# Patient Record
Sex: FEMALE | Race: WHITE | NOT HISPANIC OR LATINO | Employment: FULL TIME | URBAN - METROPOLITAN AREA
[De-identification: names, ages, dates, MRNs, and addresses within clinical notes are randomized per-mention and may not be internally consistent; named-entity substitution may affect disease eponyms.]

---

## 2017-09-26 ENCOUNTER — AMBULATORY - HEALTHEAST (OUTPATIENT)
Dept: INTERNAL MEDICINE | Facility: CLINIC | Age: 57
End: 2017-09-26

## 2017-09-26 ENCOUNTER — COMMUNICATION - HEALTHEAST (OUTPATIENT)
Dept: INTERNAL MEDICINE | Facility: CLINIC | Age: 57
End: 2017-09-26

## 2017-09-27 ENCOUNTER — RECORDS - HEALTHEAST (OUTPATIENT)
Dept: ADMINISTRATIVE | Facility: OTHER | Age: 57
End: 2017-09-27

## 2017-09-29 ENCOUNTER — COMMUNICATION - HEALTHEAST (OUTPATIENT)
Dept: INTERNAL MEDICINE | Facility: CLINIC | Age: 57
End: 2017-09-29

## 2017-10-17 ENCOUNTER — HOSPITAL ENCOUNTER (OUTPATIENT)
Dept: MAMMOGRAPHY | Facility: CLINIC | Age: 57
Discharge: HOME | End: 2017-10-17
Attending: INTERNAL MEDICINE

## 2017-10-17 DIAGNOSIS — Z12.31 VISIT FOR SCREENING MAMMOGRAM: ICD-10-CM

## 2017-10-23 ENCOUNTER — COMMUNICATION - HEALTHEAST (OUTPATIENT)
Dept: INTERNAL MEDICINE | Facility: CLINIC | Age: 57
End: 2017-10-23

## 2017-10-24 ENCOUNTER — OFFICE VISIT - HEALTHEAST (OUTPATIENT)
Dept: INTERNAL MEDICINE | Facility: CLINIC | Age: 57
End: 2017-10-24

## 2017-10-24 DIAGNOSIS — Z78.0 POSTMENOPAUSAL: ICD-10-CM

## 2017-10-24 DIAGNOSIS — H26.9 CATARACT: ICD-10-CM

## 2017-10-24 DIAGNOSIS — Z00.00 ROUTINE GENERAL MEDICAL EXAMINATION AT A HEALTH CARE FACILITY: ICD-10-CM

## 2017-10-24 ASSESSMENT — MIFFLIN-ST. JEOR: SCORE: 1446.99

## 2017-10-25 LAB
CHOLEST SERPL-MCNC: 248 MG/DL
FASTING STATUS PATIENT QL REPORTED: YES
HCV AB SERPL QL IA: NEGATIVE
HDLC SERPL-MCNC: 52 MG/DL
LDLC SERPL CALC-MCNC: 177 MG/DL
TRIGL SERPL-MCNC: 95 MG/DL

## 2017-12-13 ENCOUNTER — RECORDS - HEALTHEAST (OUTPATIENT)
Dept: ADMINISTRATIVE | Facility: OTHER | Age: 57
End: 2017-12-13

## 2017-12-13 ENCOUNTER — RECORDS - HEALTHEAST (OUTPATIENT)
Dept: BONE DENSITY | Facility: CLINIC | Age: 57
End: 2017-12-13

## 2017-12-13 DIAGNOSIS — Z78.0 ASYMPTOMATIC MENOPAUSAL STATE: ICD-10-CM

## 2020-07-01 ENCOUNTER — HOSPITAL ENCOUNTER (OUTPATIENT)
Dept: MAMMOGRAPHY | Facility: CLINIC | Age: 60
Discharge: HOME | End: 2020-07-01
Attending: INTERNAL MEDICINE

## 2020-07-01 DIAGNOSIS — Z12.31 VISIT FOR SCREENING MAMMOGRAM: ICD-10-CM

## 2020-08-24 ENCOUNTER — OFFICE VISIT - HEALTHEAST (OUTPATIENT)
Dept: INTERNAL MEDICINE | Facility: CLINIC | Age: 60
End: 2020-08-24

## 2020-08-24 ENCOUNTER — COMMUNICATION - HEALTHEAST (OUTPATIENT)
Dept: INTERNAL MEDICINE | Facility: CLINIC | Age: 60
End: 2020-08-24

## 2020-08-24 ENCOUNTER — AMBULATORY - HEALTHEAST (OUTPATIENT)
Dept: INTERNAL MEDICINE | Facility: CLINIC | Age: 60
End: 2020-08-24

## 2020-08-24 DIAGNOSIS — Z13.220 ENCOUNTER FOR SCREENING FOR LIPOID DISORDERS: ICD-10-CM

## 2020-08-24 DIAGNOSIS — Z00.00 ROUTINE GENERAL MEDICAL EXAMINATION AT A HEALTH CARE FACILITY: ICD-10-CM

## 2020-08-24 DIAGNOSIS — J30.2 SEASONAL ALLERGIC RHINITIS, UNSPECIFIED TRIGGER: ICD-10-CM

## 2020-08-24 DIAGNOSIS — E78.00 HYPERCHOLESTEROLEMIA: ICD-10-CM

## 2020-08-24 LAB
ALBUMIN SERPL-MCNC: 3.9 G/DL (ref 3.5–5)
ALBUMIN UR-MCNC: NEGATIVE MG/DL
ALP SERPL-CCNC: 66 U/L (ref 45–120)
ALT SERPL W P-5'-P-CCNC: 20 U/L (ref 0–45)
ANION GAP SERPL CALCULATED.3IONS-SCNC: 11 MMOL/L (ref 5–18)
APPEARANCE UR: CLEAR
AST SERPL W P-5'-P-CCNC: 19 U/L (ref 0–40)
BILIRUB SERPL-MCNC: 0.5 MG/DL (ref 0–1)
BILIRUB UR QL STRIP: NEGATIVE
BUN SERPL-MCNC: 10 MG/DL (ref 8–22)
CALCIUM SERPL-MCNC: 9.6 MG/DL (ref 8.5–10.5)
CHLORIDE BLD-SCNC: 103 MMOL/L (ref 98–107)
CHOLEST SERPL-MCNC: 248 MG/DL
CO2 SERPL-SCNC: 26 MMOL/L (ref 22–31)
COLOR UR AUTO: YELLOW
CREAT SERPL-MCNC: 0.78 MG/DL (ref 0.6–1.1)
ERYTHROCYTE [DISTWIDTH] IN BLOOD BY AUTOMATED COUNT: 12.3 % (ref 11–14.5)
FASTING STATUS PATIENT QL REPORTED: YES
GFR SERPL CREATININE-BSD FRML MDRD: >60 ML/MIN/1.73M2
GLUCOSE BLD-MCNC: 93 MG/DL (ref 70–125)
GLUCOSE UR STRIP-MCNC: NEGATIVE MG/DL
HCT VFR BLD AUTO: 41.7 % (ref 35–47)
HDLC SERPL-MCNC: 53 MG/DL
HGB BLD-MCNC: 14.1 G/DL (ref 12–16)
HGB UR QL STRIP: NEGATIVE
KETONES UR STRIP-MCNC: NEGATIVE MG/DL
LDLC SERPL CALC-MCNC: 176 MG/DL
LEUKOCYTE ESTERASE UR QL STRIP: NEGATIVE
MCH RBC QN AUTO: 29.9 PG (ref 27–34)
MCHC RBC AUTO-ENTMCNC: 33.7 G/DL (ref 32–36)
MCV RBC AUTO: 89 FL (ref 80–100)
NITRATE UR QL: NEGATIVE
PH UR STRIP: 6.5 [PH] (ref 5–8)
PLATELET # BLD AUTO: 361 THOU/UL (ref 140–440)
PMV BLD AUTO: 7.6 FL (ref 7–10)
POTASSIUM BLD-SCNC: 4.7 MMOL/L (ref 3.5–5)
PROT SERPL-MCNC: 6.7 G/DL (ref 6–8)
RBC # BLD AUTO: 4.7 MILL/UL (ref 3.8–5.4)
SODIUM SERPL-SCNC: 140 MMOL/L (ref 136–145)
SP GR UR STRIP: 1.01 (ref 1–1.03)
TRIGL SERPL-MCNC: 95 MG/DL
UROBILINOGEN UR STRIP-ACNC: NORMAL
WBC: 6.1 THOU/UL (ref 4–11)

## 2020-08-24 RX ORDER — FEXOFENADINE HCL 180 MG/1
180 TABLET ORAL DAILY PRN
Qty: 30 TABLET | Refills: 2 | Status: SHIPPED | COMMUNITY
Start: 2020-08-24

## 2020-08-24 ASSESSMENT — MIFFLIN-ST. JEOR: SCORE: 1437.92

## 2020-08-25 LAB
25(OH)D3 SERPL-MCNC: 30.7 NG/ML (ref 30–80)
25(OH)D3 SERPL-MCNC: 30.7 NG/ML (ref 30–80)

## 2020-11-05 ENCOUNTER — AMBULATORY - HEALTHEAST (OUTPATIENT)
Dept: INTERNAL MEDICINE | Facility: CLINIC | Age: 60
End: 2020-11-05

## 2020-11-05 ENCOUNTER — AMBULATORY - HEALTHEAST (OUTPATIENT)
Dept: NURSING | Facility: CLINIC | Age: 60
End: 2020-11-05

## 2020-11-05 ENCOUNTER — COMMUNICATION - HEALTHEAST (OUTPATIENT)
Dept: FAMILY MEDICINE | Facility: CLINIC | Age: 60
End: 2020-11-05

## 2020-11-05 DIAGNOSIS — Z23 ENCOUNTER FOR IMMUNIZATION: ICD-10-CM

## 2021-03-30 ENCOUNTER — AMBULATORY - HEALTHEAST (OUTPATIENT)
Dept: NURSING | Facility: CLINIC | Age: 61
End: 2021-03-30

## 2021-04-20 ENCOUNTER — AMBULATORY - HEALTHEAST (OUTPATIENT)
Dept: NURSING | Facility: CLINIC | Age: 61
End: 2021-04-20

## 2021-05-25 ENCOUNTER — RECORDS - HEALTHEAST (OUTPATIENT)
Dept: ADMINISTRATIVE | Facility: CLINIC | Age: 61
End: 2021-05-25

## 2021-05-27 ENCOUNTER — RECORDS - HEALTHEAST (OUTPATIENT)
Dept: ADMINISTRATIVE | Facility: CLINIC | Age: 61
End: 2021-05-27

## 2021-05-28 ENCOUNTER — RECORDS - HEALTHEAST (OUTPATIENT)
Dept: ADMINISTRATIVE | Facility: CLINIC | Age: 61
End: 2021-05-28

## 2021-05-29 ENCOUNTER — RECORDS - HEALTHEAST (OUTPATIENT)
Dept: ADMINISTRATIVE | Facility: CLINIC | Age: 61
End: 2021-05-29

## 2021-05-30 ENCOUNTER — RECORDS - HEALTHEAST (OUTPATIENT)
Dept: ADMINISTRATIVE | Facility: CLINIC | Age: 61
End: 2021-05-30

## 2021-05-31 VITALS — WEIGHT: 196 LBS | BODY MASS INDEX: 32.65 KG/M2 | HEIGHT: 65 IN

## 2021-06-04 VITALS
SYSTOLIC BLOOD PRESSURE: 130 MMHG | HEART RATE: 73 BPM | BODY MASS INDEX: 32.32 KG/M2 | HEIGHT: 65 IN | WEIGHT: 194 LBS | DIASTOLIC BLOOD PRESSURE: 84 MMHG | OXYGEN SATURATION: 99 %

## 2021-06-12 NOTE — TELEPHONE ENCOUNTER
I entered new order as I cannot find where I was suppose to cosign this order anywhere on the chart

## 2021-06-13 NOTE — PROGRESS NOTES
Office Visit - Physical   Chela Meeks   57 y.o.  female    Date of visit: 10/24/2017  Physician: Manolo Metzger MD     Assessment and Plan   1. Routine general medical examination at a health care facility  Immunizations are reviewed and she declines having flu shot.  Will confirm when she has had her tetanus.  Living will discussed.  Non-smoker.  Uses alcohol in moderation.  Regular exercise discussed.  Up to date with colonoscopies and this should be repeated every 10 years.  She sees an OB/GYN and get a mammogram annually.  She is postmenopausal and will make arrangements for baseline DEXA.  She sees an ophthalmologist regularly and gets glaucoma screening.  Skin exam performed and recommending regular use of sunblock.  Hepatitis C antibody for screening.  Will screen for diabetes with fasting glucose.  Checking fasting lipid profile.  - Lipid [place]  - Comprehensive Metabolic Panel  - Hemoglobin  - Urinalysis  - Hepatitis C Antibody (Anti-HCV)    2. Cataract  Preop completed.  See separate form    3. Postmenopausal  Arrange for DEXA  - DXA Bone Density Scan; Future    Return in about 1 year (around 10/24/2018) for Annual physical.     Chief Complaint   Chief Complaint   Patient presents with     Annual Exam     Pre-op Exam     10-31 11-14 [facility] Dr oSrensen        Patient Profile   Social History     Social History Narrative        [employer]    3 children    4 grandchildren        Past Medical History   Patient Active Problem List   Diagnosis     Cataract     Impingement syndrome of right shoulder       Past Surgical History  She has a past surgical history that includes Tubal ligation.     History of Present Illness   This 57 y.o. old woman is here for her annual physical and to reestablish care.  Not seen for over 3 years.  Also having cataract surgery and needs preop.  Some concerns about difficulty losing weight but otherwise feeling well.  Lifestyle is rather  "sedentary mostly because of her job.  She is a non-smoker.    Review of Systems: A comprehensive review of systems was negative except as noted.  General: No chronic fatigue, unexpected weight loss or weight gain, fevers, chills, or night sweats  Eyes: Cataracts  ENT: No ear or sinus infections.  No change in hearing.  No tinnitus.  Respiratory: No wheezing, dyspnea on exertion, or chronic cough  Cardiovascular: No chest pain, palpitations, dizziness, or syncope.  No peripheral edema.  GI: No abdominal pain, reflux symptoms, dysphagia, nausea, vomiting, constipation, or diarrhea  : No change in frequency or incontinence.  No hematuria.  Skin: No new rashes or lesions.  Neurologic: No headaches, seizures, dizziness, weakness, or numbness.  Musculoskeletal: No muscle or joint pain.  Lymphatic: No swollen lymph nodes  Psychiatric: No depression, anxiety, or sleep disorder.       Medications and Allergies   No current outpatient prescriptions on file.     No current facility-administered medications for this visit.      No Known Allergies     Family and Social History   Family History   Problem Relation Age of Onset     Lung cancer Mother      Esophageal cancer Father      Prostate cancer Father      Diabetes type II Brother         Social History   Substance Use Topics     Smoking status: Never Smoker     Smokeless tobacco: Never Used     Alcohol use Yes      Comment: 2-4 / week        Physical Exam   General Appearance:   Overweight but otherwise well-appearing middle-aged woman    /72 (Patient Site: Left Arm, Patient Position: Sitting, Cuff Size: Adult Regular)  Pulse 64  Ht 5' 4.5\" (1.638 m)  Wt 196 lb (88.9 kg)  LMP 08/04/2014  SpO2 98%  BMI 33.12 kg/m2    EYES: Eyelids, conjunctiva, and sclera were normal. Pupils were normal. Cornea, iris, and lens were normal bilaterally.  HEAD, EARS, NOSE, MOUTH, AND THROAT: Head and face were normal. Nose appearance was normal and there was no discharge. " Oropharynx was normal.  NECK: Neck appearance was normal. There were no neck masses and the thyroid was not enlarged and no nodules are felt.  No lymphadenopathy.  RESPIRATORY: Breathing pattern was normal and the chest moved symmetrically.  Percussion/auscultatory percussion was normal.  Lung sounds were normal and there were no rales or wheezes.  CARDIOVASCULAR: Heart rate and rhythm were normal.  S1 and S2 were normal and there were no extra sounds or murmurs. Peripheral pulses in arms and legs were normal.  Jugular venous pressure was normal.  There was no peripheral edema.  No carotid bruits.  BREAST: Deferred, OB/GYN  GASTROINTESTINAL: The abdomen was normal in contour.  Bowel sounds were present.  Percussion detected no organ enlargement or tenderness.  Palpation detected no tenderness, mass, or enlarged organs.   PELVIC: Deferred, OB/GYN  MUSCULOSKELETAL: Skeletal configuration was normal and muscle mass was normal for age. Joint appearance was overall normal.  LYMPHATIC: There were no enlarged nodes.  SKIN/HAIR/NAILS: Skin color was normal.  There were no skin lesions.  Hair and nails were normal.  NEUROLOGIC: The patient was alert and oriented to person, place, time, and circumstance. Speech was normal. Cranial nerves were normal. Motor strength was normal for age. The patient was normally coordinated.  Sensation intact.  PSYCHIATRIC:  Mood and affect were normal and the patient had normal recent and remote memory. The patient's judgment and insight were normal.       Additional Information        Manolo Metzger MD  Internal Medicine  Contact me at [phone]

## 2021-06-13 NOTE — PROGRESS NOTES
Preoperative Consultation   Chela Meeks   57 y.o.  female    Date of visit: 10/24/2017  Physician: Manolo Metzger MD    This is a preoperative consultation requested by Dr. Sorensen in preparation for bilateral cataract extraction on October 31, 2017 and November 14, 2017 at [facility].       Assessment and Plan   Chela Meeks was seen in preoperative consultation in preparation for bilateral cataract extraction.  This is a low risk surgery and the patient has no increased risk for major cardiac complications.  No contraindications to proceeding with general anesthesia and surgery    1. Routine general medical examination at a health care facility    2. Cataract    3. Postmenopausal         Patient Profile   Social History     Social History Narrative        [employer]    3 children    4 grandchildren        Past Medical History   Patient Active Problem List   Diagnosis     Cataract     Impingement syndrome of right shoulder       Past Surgical History  She has a past surgical history that includes Tubal ligation.     History of Present Illness   This 57 y.o. old healthy woman with cataracts to have surgery in the next few weeks.  No history of coronary artery disease or congestive heart failure.  No chronic respiratory problems.  No exertional chest pain.  No problems with general anesthesia.    Recent antiplatelet use: no  Personal or family history of bleeding or clotting disorders: no  Steroid use in the past year: no  Personal or family history of difficulty with anesthesia: no  Current cardiopulmonary symptoms: no      Review of Systems: A comprehensive review of systems was negative except as noted.     Medications and Allergies   No current outpatient prescriptions on file.     No current facility-administered medications for this visit.      No Known Allergies     Family and Social History   Family History   Problem Relation Age of Onset     Lung cancer  "Mother      Esophageal cancer Father      Prostate cancer Father      Diabetes type II Brother         Social History   Substance Use Topics     Smoking status: Never Smoker     Smokeless tobacco: Never Used     Alcohol use Yes      Comment: 2-4 / week        Physical Exam   General Appearance:   Overweight but otherwise well-appearing middle-aged woman    /72 (Patient Site: Left Arm, Patient Position: Sitting, Cuff Size: Adult Regular)  Pulse 64  Ht 5' 4.5\" (1.638 m)  Wt 196 lb (88.9 kg)  LMP 08/04/2014  SpO2 98%  BMI 33.12 kg/m2    HEENT: Normal  RESPIRATORY: Breathing pattern was normal and the chest moved symmetrically.  Percussion/auscultatory percussion was normal.  Lung sounds were normal and there were no abnormal sounds.  CARDIOVASCULAR: Heart rate and rhythm were normal.  S1 and S2 were normal and there were no extra sounds or murmurs. Peripheral pulses in arms and legs were normal.  Jugular venous pressure was normal.  There was no peripheral edema.  No carotid bruits.  GASTROINTESTINAL: The abdomen was normal in contour.  Bowel sounds were present.  Percussion detected no organ enlargement or tenderness.  Palpation detected no tenderness, mass, or enlarged organs.   SKIN/HAIR/NAILS: No cyanosis or pallor  NEUROLOGIC: Grossly nonfocal  PSYCHIATRIC:  Mood and affect were normal and the patient had normal recent and remote memory. The patient's judgment and insight were normal.         Additional Tests            Manolo Metzger MD  Internal Medicine  Contact me at [phone]  "

## 2021-06-16 PROBLEM — M75.41 IMPINGEMENT SYNDROME OF RIGHT SHOULDER: Status: ACTIVE | Noted: 2017-10-24

## 2021-06-18 NOTE — PATIENT INSTRUCTIONS - HE
Patient Instructions by Manolo Metzger MD at 8/24/2020 11:20 AM     Author: Manolo Metzger MD Service: -- Author Type: Physician    Filed: 8/24/2020 11:59 AM Encounter Date: 8/24/2020 Status: Addendum    : Manolo Metzger MD (Physician)    Related Notes: Original Note by Manolo Metzger MD (Physician) filed at 8/24/2020 11:50 AM       Healthcare maintenance    Recommending annual flu shot every fall    Make sure you return in 2 to 6 months to have your second Shingrix vaccine    Recommending getting a tetanus vaccine (Tdap).  This can be obtained at your pharmacy as well    Continue mammograms every year    Follow-up with OB/GYN    Colonoscopy will be due 2024      Patient Education   Understanding USDA MyPlate  The USDA (US Department of Agriculture) has guidelines to help you make healthy food choices. These are called MyPlate. MyPlate shows the food groups that make up healthy meals using the image of a place setting. Before you eat, think about the healthiest choices for what to put onto your plate or into your cup or bowl. To learn more about building a healthy plate, visit www.choosemyplate.gov.       The Food Groups    Fruits: Any fruit or 100% fruit juice counts as part of the Fruit Group. Fruits may be fresh, canned, frozen, or dried, and may be whole, cut-up, or pureed. Make half your plate fruits and vegetables.    Vegetables: Any vegetable or 100% vegetable juice counts as a member of the Vegetable Group. Vegetables may be fresh, frozen, canned, or dried. They can be served raw or cooked and may be whole, cut-up, or mashed. Make half your plate fruits and vegetables.     Grains: All foods made from grains are part of the Grains Group. These include wheat, rice, oats, cornmeal, and barley such as bread, pasta, oatmeal, cereal, tortillas, and grits. Grains should be no more than a quarter of your plate. At least half of your grains should be whole grains.    Protein: This  group includes meat, poultry, seafood, beans and peas, eggs, processed soy products (like tofu), nuts (including nut butters), and seeds. Make protein choices no more than a quarter of your plate. Meat and poultry choices should be lean or low fat.    Dairy: All fluid milk products and foods made from milk that contain calcium, like yogurt and cheese are part of the Dairy Group. (Foods that have little calcium, such as cream, butter, and cream cheese, are not part of the group.) Most dairy choices should be low-fat or fat-free.    Oils: These are fats that are liquid at room temperature. They include canola, corn, olive, soybean, and sunflower oil. Foods that are mainly oil include mayonnaise, certain salad dressings, and soft margarines. You should have only 5 to 7 teaspoons of oils a day. You probably already get this much from the food you eat.  Use [employer] to Help Build Your Meals  The SuperTracker can help you plan and track your meals and activity. You can look up individual foods to see or compare their nutritional value. You can get guidelines for what and how much you should eat. You can compare your food choices. And you can assess personal physical activities and see ways you can improve. Go to www.[place]plate.gov/supertracker/.    1064-9106 [employer]. [address] [place] [address], [place] [address]. All rights reserved. This information is not intended as a substitute for professional medical care. Always follow your healthcare professional's instructions.

## 2021-06-27 ENCOUNTER — HEALTH MAINTENANCE LETTER (OUTPATIENT)
Age: 61
End: 2021-06-27

## 2021-06-29 NOTE — PROGRESS NOTES
Progress Notes by Manolo Metzger MD at 8/24/2020 11:20 AM     Author: Manolo Metzger MD Service: -- Author Type: Physician    Filed: 8/24/2020  5:45 PM Encounter Date: 8/24/2020 Status: Signed    : Manolo Metzger MD (Physician)       FEMALE PREVENTATIVE EXAM    Assessment and Plan:       1. Routine general medical examination at a health care facility  Immunizations are reviewed and recommending flu shot and Tdap.  She can get these at her pharmacy.  Providing first Shingrix today and she will return in 2 to 6 months for her second injection.  We discussed possible side effects of vaccine. Living will has been discussed.  Non-smoker.  Uses alcohol in moderation.  Regular exercise discussed.  Up to date with colonoscopies and this should be repeated in 2024.  Continue mammograms annually.  Continue to follow-up with OB/GYN annually.  DEXA at age 65 for osteoporosis screening.  Recommending eye exam every 1 to 2 years.  Skin exam performed and recommending regular use of sunblock.  Hepatitis C antibody for screening was normal.  Will screen for diabetes with fasting glucose.  Checking fasting lipid profile.   - Comprehensive Metabolic Panel  - HM2(CBC w/o Differential)  - Urinalysis-UC if Indicated  - Vitamin D, Total (25-Hydroxy)    2. Hypercholesterolemia  Cholesterol was elevated last year and this will be rechecked today    3. Seasonal allergic rhinitis, unspecified trigger  Uses Allegra for seasonal allergies  - fexofenadine (ALLEGRA) 180 MG tablet; Take 1 tablet (180 mg total) by mouth daily as needed.  Dispense: 30 tablet; Refill: 2    4. Encounter for screening for lipoid disorders    - Lipid [place]- FASTING        Next follow up:  Return in 1 year (on 8/24/2021) for Annual physical.    Immunization Review  Adult Imm Review: See above        I discussed the following with the patient:   Adult Healthy Living: Importance of regular exercise  Healthy nutrition  Importance of weight  "loss        Subjective:   Chief Complaint: Chela Meeks is an 60 y.o. female here for a preventative health visit.     HPI: Here for her annual physical.  Overall feeling well with no new specific complaints.  She had lost 20 pounds last year but put most of this back on.  She has been more sedentary and needs to work at diet modifications    Healthy Habits  Are you taking a daily aspirin? No  Do you typically exercising at least 40 min, 3-4 times per week?  NO  Do you usually eat at least 4 servings of fruit and vegetables a day, include whole grains and fiber and avoid regularly eating high fat foods? NO  Have you had an eye exam in the past two years? Yes  Do you see a dentist twice per year? Yes  Do you have any concerns regarding sleep? No    Safety Screen    Do you feel you are safe where you are living?: Yes (8/24/2020 11:20 AM)  Do you feel you are safe in your relationship(s)?: Yes (8/24/2020 11:20 AM)      Review of Systems:  Please see above.  The rest of the review of systems are negative for all systems.     Pap History:   Last 3 PAP and HPV Results:   Cancer Screening       Status Date      PAP SMEAR Overdue 4/6/1981     MAMMOGRAM Next Due 7/1/2022      Done 7/1/2020 MAMMO SCREENING BILATERAL     Patient has more history with this topic...              History     Reviewed By Date/Time Sections Reviewed    Manolo Metzger MD 8/24/2020 11:40 AM Medical, Surgical, Tobacco, Alcohol, Drug Use, Sexual Activity, Family, Social Documentation    Katy Marshall CMA 8/24/2020 11:20 AM Tobacco, Alcohol, Drug Use, Sexual Activity            Objective:   Vital Signs:   Visit Vitals  /84 (Patient Site: Left Arm, Patient Position: Sitting, Cuff Size: Adult Large)   Pulse 73   Ht 5' 4.5\" (1.638 m)   Wt 194 lb (88 kg)   LMP 08/04/2014   SpO2 99%   BMI 32.79 kg/m           PHYSICAL EXAM  EYES: Eyelids, conjunctiva, and sclera were normal. Pupils were normal. Cornea, iris, and lens were normal " bilaterally.  HEAD, EARS, NOSE, MOUTH, AND THROAT: Head and face were normal.  TMs normal  NECK: Neck appearance was normal. There were no neck masses and the thyroid was not enlarged and no nodules are felt.  No lymphadenopathy.  RESPIRATORY: Breathing pattern was normal and the chest moved symmetrically.  Percussion/auscultatory percussion was normal.  Lung sounds were normal and there were no rales or wheezes.  CARDIOVASCULAR: Heart rate and rhythm were normal.  S1 and S2 were normal and there were no extra sounds or murmurs. Peripheral pulses in arms and legs were normal.  Jugular venous pressure was normal.  There was no peripheral edema.  No carotid bruits.  GASTROINTESTINAL: Bowel sounds were present.   Palpation detected no tenderness, mass, or enlarged organs.   MUSCULOSKELETAL: Skeletal configuration was normal and muscle mass was normal for age. Joint appearance was overall normal.  LYMPHATIC: There were no enlarged nodes.  SKIN/HAIR/NAILS: Skin color was normal.  Hair and nails were normal.There were no skin lesions.  NEUROLOGIC: The patient was alert and oriented to person, place, time, and circumstance. Speech was normal. Cranial nerves were normal. Motor strength was normal for age. The patient was normally coordinated.  Sensation intact.  PSYCHIATRIC:  Mood and affect were normal and the patient had normal recent and remote memory. The patient's judgment and insight were normal.    The 10-year ASCVD risk score (Ciro DC Jr., et al., 2013) is: 4.2%    Values used to calculate the score:      Age: 60 years      Sex: Female      Is Non- : No      Diabetic: No      Tobacco smoker: No      Systolic Blood Pressure: 130 mmHg      Is BP treated: No      HDL Cholesterol: 52 mg/dL      Total Cholesterol: 248 mg/dL         Medication List          Accurate as of August 24, 2020  5:45 PM. If you have any questions, ask your nurse or doctor.            START taking these medications     fexofenadine 180 MG tablet  Also known as:  ALLEGRA  INSTRUCTIONS:  Take 1 tablet (180 mg total) by mouth daily as needed.  Started by:  Manolo Metzger MD              Where to Get Your Medications      You can get these medications from any pharmacy    You don't need a prescription for these medications    fexofenadine 180 MG tablet         Additional Screenings Completed Today:

## 2021-07-13 ENCOUNTER — MYC MEDICAL ADVICE (OUTPATIENT)
Dept: INTERNAL MEDICINE | Facility: CLINIC | Age: 61
End: 2021-07-13

## 2021-07-13 ENCOUNTER — RECORDS - HEALTHEAST (OUTPATIENT)
Dept: ADMINISTRATIVE | Facility: CLINIC | Age: 61
End: 2021-07-13

## 2021-07-13 NOTE — TELEPHONE ENCOUNTER
I can see her for a video visit at 3:50 PM on Tuesday, July 20    LMOM that she was added to the schedule for next Tuesday, July 20th at 3:50  Fani Peterson CMA

## 2021-07-20 ENCOUNTER — VIRTUAL VISIT (OUTPATIENT)
Dept: INTERNAL MEDICINE | Facility: CLINIC | Age: 61
End: 2021-07-20
Payer: COMMERCIAL

## 2021-07-20 DIAGNOSIS — Z29.89 ALTITUDE SICKNESS PREVENTATIVE MEASURES: Primary | ICD-10-CM

## 2021-07-20 PROCEDURE — 99213 OFFICE O/P EST LOW 20 MIN: CPT | Mod: 95 | Performed by: INTERNAL MEDICINE

## 2021-07-20 RX ORDER — ACETAZOLAMIDE 125 MG/1
125 TABLET ORAL 2 TIMES DAILY
Qty: 16 TABLET | Refills: 0 | Status: SHIPPED | OUTPATIENT
Start: 2021-07-20 | End: 2023-07-20

## 2021-07-20 NOTE — PROGRESS NOTES
"Chela is a 61 year old who is being evaluated via a billable telephone visit.      What phone number would you like to be contacted at? [phone]  How would you like to obtain your AVS? Rick    Assessment & Plan     Altitude sickness preventative measures  61-year-old woman with a history of getting altitude sickness who is planning trip to [place] next week.  She will be at approximately 8000 feet.  She previously took medication but she cannot remember what that was.  However, it did allow her to travel to high altitude of [place].  I would recommend starting Diamox 125 mg twice daily 24 hours before arriving at altitude.  She should continue the medication for a minimum of 48 hours and use as needed thereafter.  We also discussed maintaining good hydration drinking plenty of water.  I also discussed the need to get treatment at urgent care should her symptoms develop despite the above measures.  - acetaZOLAMIDE (DIAMOX) 125 MG tablet; Take 1 tablet (125 mg) by mouth 2 times daily Begin the day before arriving at altitude and continue for at least 2 days after arriving        {Provider  Link to [facility] Help Grid :[id number]}     BMI:   Estimated body mass index is 32.79 kg/m  as calculated from the following:    Height as of 8/24/20: 1.638 m (5' 4.5\").    Weight as of 8/24/20: 88 kg (194 lb).           Return in about 3 months (around 10/20/2021) for Routine preventive.    Manolo Metzger MD  [facility]    Bernardo York is a 61 year old who presents for the following health issues-prevention of high altitude sickness    HPI   Discussed prevention of high-altitude sickness.  See assessment plan for details.        Review of Systems         Objective           Vitals:  No vitals were obtained today due to virtual visit.    Physical Exam   No exam possible during telephone visit              Phone call duration: 7 minutes  "

## 2021-07-21 ENCOUNTER — RECORDS - HEALTHEAST (OUTPATIENT)
Dept: ADMINISTRATIVE | Facility: CLINIC | Age: 61
End: 2021-07-21

## 2021-07-22 ENCOUNTER — RECORDS - HEALTHEAST (OUTPATIENT)
Dept: SCHEDULING | Facility: CLINIC | Age: 61
End: 2021-07-22

## 2021-07-22 DIAGNOSIS — Z12.31 OTHER SCREENING MAMMOGRAM: ICD-10-CM

## 2021-10-17 ENCOUNTER — HEALTH MAINTENANCE LETTER (OUTPATIENT)
Age: 61
End: 2021-10-17

## 2021-12-06 DIAGNOSIS — T75.3XXD SEA SICKNESS, SUBSEQUENT ENCOUNTER: Primary | ICD-10-CM

## 2021-12-06 RX ORDER — SCOLOPAMINE TRANSDERMAL SYSTEM 1 MG/1
1 PATCH, EXTENDED RELEASE TRANSDERMAL
Qty: 2 PATCH | Refills: 1 | Status: SHIPPED | OUTPATIENT
Start: 2021-12-06 | End: 2023-07-20

## 2022-02-08 ENCOUNTER — HOSPITAL ENCOUNTER (OUTPATIENT)
Dept: MAMMOGRAPHY | Facility: CLINIC | Age: 62
Discharge: HOME | End: 2022-02-08
Attending: INTERNAL MEDICINE | Admitting: INTERNAL MEDICINE
Payer: COMMERCIAL

## 2022-02-08 DIAGNOSIS — Z12.31 VISIT FOR SCREENING MAMMOGRAM: ICD-10-CM

## 2022-02-08 PROCEDURE — 77067 SCR MAMMO BI INCL CAD: CPT

## 2022-03-08 ENCOUNTER — TRANSFERRED RECORDS (OUTPATIENT)
Dept: HEALTH INFORMATION MANAGEMENT | Facility: CLINIC | Age: 62
End: 2022-03-08
Payer: COMMERCIAL

## 2022-10-02 ENCOUNTER — HEALTH MAINTENANCE LETTER (OUTPATIENT)
Age: 62
End: 2022-10-02

## 2023-02-09 ENCOUNTER — HOSPITAL ENCOUNTER (OUTPATIENT)
Dept: MAMMOGRAPHY | Facility: CLINIC | Age: 63
Discharge: HOME | End: 2023-02-09
Attending: INTERNAL MEDICINE | Admitting: INTERNAL MEDICINE
Payer: COMMERCIAL

## 2023-02-09 DIAGNOSIS — Z12.31 VISIT FOR SCREENING MAMMOGRAM: ICD-10-CM

## 2023-02-09 PROCEDURE — 77067 SCR MAMMO BI INCL CAD: CPT

## 2023-02-11 ENCOUNTER — HEALTH MAINTENANCE LETTER (OUTPATIENT)
Age: 63
End: 2023-02-11

## 2023-07-11 ENCOUNTER — LAB REQUISITION (OUTPATIENT)
Dept: LAB | Facility: CLINIC | Age: 63
End: 2023-07-11
Payer: COMMERCIAL

## 2023-07-11 DIAGNOSIS — Z01.419 ENCOUNTER FOR GYNECOLOGICAL EXAMINATION (GENERAL) (ROUTINE) WITHOUT ABNORMAL FINDINGS: ICD-10-CM

## 2023-07-11 PROCEDURE — 87624 HPV HI-RISK TYP POOLED RSLT: CPT | Mod: ORL | Performed by: OBSTETRICS & GYNECOLOGY

## 2023-07-11 PROCEDURE — G0145 SCR C/V CYTO,THINLAYER,RESCR: HCPCS | Mod: ORL | Performed by: OBSTETRICS & GYNECOLOGY

## 2023-07-14 LAB
BKR LAB AP GYN ADEQUACY: NORMAL
BKR LAB AP GYN INTERPRETATION: NORMAL
BKR LAB AP HPV REFLEX: NORMAL
BKR LAB AP LMP: NORMAL
BKR LAB AP PREVIOUS ABNL DX: NORMAL
BKR LAB AP PREVIOUS ABNORMAL: NORMAL
PATH REPORT.COMMENTS IMP SPEC: NORMAL
PATH REPORT.COMMENTS IMP SPEC: NORMAL
PATH REPORT.RELEVANT HX SPEC: NORMAL

## 2023-07-18 LAB
HUMAN PAPILLOMA VIRUS 16 DNA: NEGATIVE
HUMAN PAPILLOMA VIRUS 18 DNA: NEGATIVE
HUMAN PAPILLOMA VIRUS FINAL DIAGNOSIS: NORMAL
HUMAN PAPILLOMA VIRUS OTHER HR: NEGATIVE

## 2023-07-20 ENCOUNTER — OFFICE VISIT (OUTPATIENT)
Dept: INTERNAL MEDICINE | Facility: CLINIC | Age: 63
End: 2023-07-20
Payer: COMMERCIAL

## 2023-07-20 VITALS
HEART RATE: 99 BPM | HEIGHT: 65 IN | DIASTOLIC BLOOD PRESSURE: 70 MMHG | SYSTOLIC BLOOD PRESSURE: 122 MMHG | WEIGHT: 203 LBS | TEMPERATURE: 98 F | OXYGEN SATURATION: 99 % | BODY MASS INDEX: 33.82 KG/M2 | RESPIRATION RATE: 16 BRPM

## 2023-07-20 DIAGNOSIS — K21.9 GASTROESOPHAGEAL REFLUX DISEASE, UNSPECIFIED WHETHER ESOPHAGITIS PRESENT: ICD-10-CM

## 2023-07-20 DIAGNOSIS — R13.19 ESOPHAGEAL DYSPHAGIA: ICD-10-CM

## 2023-07-20 DIAGNOSIS — E78.00 HYPERCHOLESTEROLEMIA: ICD-10-CM

## 2023-07-20 DIAGNOSIS — R29.818 SUSPECTED SLEEP APNEA: ICD-10-CM

## 2023-07-20 DIAGNOSIS — Z00.00 ROUTINE GENERAL MEDICAL EXAMINATION AT A HEALTH CARE FACILITY: Primary | ICD-10-CM

## 2023-07-20 LAB
ALBUMIN UR-MCNC: ABNORMAL MG/DL
APPEARANCE UR: CLEAR
BACTERIA #/AREA URNS HPF: ABNORMAL /HPF
BILIRUB UR QL STRIP: NEGATIVE
COLOR UR AUTO: YELLOW
ERYTHROCYTE [DISTWIDTH] IN BLOOD BY AUTOMATED COUNT: 12.9 % (ref 10–15)
GLUCOSE UR STRIP-MCNC: NEGATIVE MG/DL
HCT VFR BLD AUTO: 39.8 % (ref 35–47)
HGB BLD-MCNC: 13.1 G/DL (ref 11.7–15.7)
HGB UR QL STRIP: NEGATIVE
KETONES UR STRIP-MCNC: 40 MG/DL
LEUKOCYTE ESTERASE UR QL STRIP: ABNORMAL
MCH RBC QN AUTO: 29.3 PG (ref 26.5–33)
MCHC RBC AUTO-ENTMCNC: 32.9 G/DL (ref 31.5–36.5)
MCV RBC AUTO: 89 FL (ref 78–100)
MUCOUS THREADS #/AREA URNS LPF: PRESENT /LPF
NITRATE UR QL: NEGATIVE
PH UR STRIP: 5.5 [PH] (ref 5–8)
PLATELET # BLD AUTO: 334 10E3/UL (ref 150–450)
RBC # BLD AUTO: 4.47 10E6/UL (ref 3.8–5.2)
RBC #/AREA URNS AUTO: ABNORMAL /HPF
SP GR UR STRIP: 1.02 (ref 1–1.03)
SQUAMOUS #/AREA URNS AUTO: ABNORMAL /LPF
UROBILINOGEN UR STRIP-ACNC: 0.2 E.U./DL
WBC # BLD AUTO: 9.1 10E3/UL (ref 4–11)
WBC #/AREA URNS AUTO: ABNORMAL /HPF

## 2023-07-20 PROCEDURE — 85027 COMPLETE CBC AUTOMATED: CPT | Performed by: INTERNAL MEDICINE

## 2023-07-20 PROCEDURE — 80053 COMPREHEN METABOLIC PANEL: CPT | Performed by: INTERNAL MEDICINE

## 2023-07-20 PROCEDURE — 90715 TDAP VACCINE 7 YRS/> IM: CPT | Performed by: INTERNAL MEDICINE

## 2023-07-20 PROCEDURE — 80061 LIPID PANEL: CPT | Performed by: INTERNAL MEDICINE

## 2023-07-20 PROCEDURE — 36415 COLL VENOUS BLD VENIPUNCTURE: CPT | Performed by: INTERNAL MEDICINE

## 2023-07-20 PROCEDURE — 84443 ASSAY THYROID STIM HORMONE: CPT | Performed by: INTERNAL MEDICINE

## 2023-07-20 PROCEDURE — 90471 IMMUNIZATION ADMIN: CPT | Performed by: INTERNAL MEDICINE

## 2023-07-20 PROCEDURE — 81001 URINALYSIS AUTO W/SCOPE: CPT | Performed by: INTERNAL MEDICINE

## 2023-07-20 PROCEDURE — 99213 OFFICE O/P EST LOW 20 MIN: CPT | Mod: 25 | Performed by: INTERNAL MEDICINE

## 2023-07-20 PROCEDURE — 99396 PREV VISIT EST AGE 40-64: CPT | Mod: 25 | Performed by: INTERNAL MEDICINE

## 2023-07-20 RX ORDER — OMEPRAZOLE 20 MG/1
20 TABLET, DELAYED RELEASE ORAL DAILY
COMMUNITY
Start: 2023-07-20 | End: 2023-08-31

## 2023-07-20 ASSESSMENT — ENCOUNTER SYMPTOMS
FREQUENCY: 0
DIARRHEA: 0
JOINT SWELLING: 0
PALPITATIONS: 0
NAUSEA: 0
FEVER: 0
DYSURIA: 0
COUGH: 0
EYE PAIN: 0
SORE THROAT: 0
HEARTBURN: 0
DIZZINESS: 0
PARESTHESIAS: 0
HEMATURIA: 0
HEMATOCHEZIA: 0
MYALGIAS: 0
ARTHRALGIAS: 0
ABDOMINAL PAIN: 0
CONSTIPATION: 0
HEADACHES: 0
BREAST MASS: 0
WEAKNESS: 0
NERVOUS/ANXIOUS: 0
CHILLS: 0
SHORTNESS OF BREATH: 0

## 2023-07-20 NOTE — PATIENT INSTRUCTIONS
Preventive Health Recommendations  Female Ages 50 - 64    Yearly exam: See your health care provider every year in order to  Review health changes.   Discuss preventive care.    Review your medicines if your doctor has prescribed any.    Get a Pap test every three years (unless you have an abnormal result and your provider advises testing more often).  If you get Pap tests with HPV test, you only need to test every 5 years, unless you have an abnormal result.   You do not need a Pap test if your uterus was removed (hysterectomy) and you have not had cancer.  You should be tested each year for STDs (sexually transmitted diseases) if you're at risk.   Have a mammogram every year  Colonoscopy will be due February 2024.  You can schedule by calling [facility] at [phone]  Cholesterol screening.  If still not at goal, I would recommend scheduling a CT coronary calcium score at [facility].  You can call [phone] to schedule.  Insurance may not cover but it should not cost more than $99 out-of-pocket  Diabetes screening  DEXA at age 65 for osteoporosis screening    Shots: Get a flu shot each year. Get a tetanus shot every 10 years (provided today).      Nutrition:   Eat at least 5 servings of fruits and vegetables each day.  Eat whole-grain bread, whole-wheat pasta and brown rice instead of white grains and rice.  Get adequate Calcium and Vitamin D.  Set a goal of getting 1200 mg of calcium daily in your diet and with supplements if needed.  Every serving of dairy provides approximately 300 mg.    Lifestyle  Exercise at least 150 minutes a week (30 minutes a day, 5 days a week). This will help you control your weight and prevent disease.  Limit alcohol to one drink per day.  No smoking.   Wear sunscreen to prevent skin cancer.  Recommending seeing a dermatologist every 1 to 2 years for a total-body skin exam  See your dentist every six months for an exam and cleaning.  See your eye doctor  every 1 to 2 years.

## 2023-07-20 NOTE — PROGRESS NOTES
SUBJECTIVE:   CC: Chela is an 63 year old who presents for preventive health visit.     63-year-old woman here for annual physical and to discuss several concerns including esophageal dysphagia, hypercholesterolemia and suspected sleep apnea.  See assessment and plan for details.          2023     5:00 PM   Additional Questions   Roomed by      Healthy Habits:     Getting at least 3 servings of Calcium per day:  Yes    Bi-annual eye exam:  Yes    Dental care twice a year:  Yes    Sleep apnea or symptoms of sleep apnea:  Daytime drowsiness    Diet:  Regular (no restrictions)    Frequency of exercise:  2-3 days/week    Duration of exercise:  30-45 minutes    Taking medications regularly:  Not Applicable    Medication side effects:  Not applicable    Additional concerns today:  No      Today's PHQ-2 Score:       2023     4:57 PM   PHQ-2 (  [employer])   Q1: Little interest or pleasure in doing things 0   Q2: Feeling down, depressed or hopeless 0   PHQ-2 Score 0   Q1: Little interest or pleasure in doing things Not at all   Q2: Feeling down, depressed or hopeless Not at all   PHQ-2 Score 0                   Have you ever done Advance Care Planning? (For example, a Health Directive, POLST, or a discussion with a medical provider or your loved ones about your wishes): Yes, patient states has an Advance Care Planning document and will bring a copy to the clinic.    Social History     Tobacco Use     Smoking status: Never     Smokeless tobacco: Never   Substance Use Topics     Alcohol use: Yes     Comment: Alcoholic Drinks/day: 2-4 / week             2023     4:57 PM   Alcohol Use   Prescreen: >3 drinks/day or >7 drinks/week? No     Reviewed orders with patient.  Reviewed health maintenance and updated orders accordingly - Yes  Lab work is in process    Breast Cancer Screenin/20/2023     4:57 PM   Breast CA Risk Assessment (FHS-7)   Do you have a family history of breast, colon, or ovarian  cancer? No / Unknown           Pertinent mammograms are reviewed under the imaging tab.    History of abnormal Pap smear: NO - age 30-65 PAP every 5 years with negative HPV co-testing recommended      Latest Ref Rng & Units 2023    10:50 AM   PAP / HPV   PAP  Negative for Intraepithelial Lesion or Malignancy (NILM)    HPV 16 DNA Negative Negative    HPV 18 DNA Negative Negative    Other HR HPV Negative Negative      Reviewed and updated as needed this visit by clinical staff   Tobacco  Allergies  Meds              Reviewed and updated as needed this visit by Provider                 Past Medical History:   Diagnosis Date     Allergic rhinitis      Esophageal dysphagia 2023     GERD (gastroesophageal reflux disease) 2023     Hypercholesterolemia      total cholesterol 205 ,       Impingement syndrome of right shoulder      Screening for osteoporosis     DEXA 2017 normal     Suspected sleep apnea 2023      Past Surgical History:   Procedure Laterality Date     CATARACT EXTRACTION Bilateral 2017      SECTION       COLONOSCOPY      2014 normal, repeat in 10 years     TUBAL LIGATION         Review of Systems   Constitutional: Negative for chills and fever.   HENT: Negative for congestion, ear pain, hearing loss and sore throat.    Eyes: Negative for pain and visual disturbance.   Respiratory: Negative for cough and shortness of breath.    Cardiovascular: Negative for chest pain, palpitations and peripheral edema.   Gastrointestinal: Negative for abdominal pain, constipation, diarrhea, heartburn, hematochezia and nausea.   Breasts:  Negative for tenderness, breast mass and discharge.   Genitourinary: Negative for dysuria, frequency, genital sores, hematuria, pelvic pain, urgency, vaginal bleeding and vaginal discharge.   Musculoskeletal: Negative for arthralgias, joint swelling and myalgias.   Skin: Negative for rash.   Neurological: Negative for  "dizziness, weakness, headaches and paresthesias.   Psychiatric/Behavioral: Negative for mood changes. The patient is not nervous/anxious.           OBJECTIVE:   /70 (BP Location: Right arm, Patient Position: Sitting, Cuff Size: Adult Regular)   Pulse 99   Temp 98  F (36.7  C) (Oral)   Resp 16   Ht 1.638 m (5' 4.5\")   Wt 92.1 kg (203 lb)   SpO2 99%   BMI 34.31 kg/m    Physical Exam  EYES: Eyelids, conjunctiva, and sclera were normal. Pupils were normal. Cornea, iris, and lens were normal bilaterally.  HEAD, EARS, NOSE, MOUTH, AND THROAT: Head and face were normal. TMs and external auditory canals are normal  NECK: Neck appearance was normal. There were no neck masses and the thyroid was not enlarged and no nodules are felt.  No lymphadenopathy.  RESPIRATORY: Breathing pattern was normal and the chest moved symmetrically.   Lung sounds were normal and there were no rales or wheezes.  CARDIOVASCULAR: Heart rate and rhythm were normal.  S1 and S2 were normal and there were no extra sounds or murmurs. Peripheral pulses in arms and legs were normal.  There was no peripheral edema.  No carotid bruits.  GASTROINTESTINAL:  Bowel sounds were present.   Palpation detected no tenderness, mass, or enlarged organs.   MUSCULOSKELETAL: Skeletal configuration was normal and muscle mass was normal for age. Joint appearance was overall normal.  LYMPHATIC: There were no enlarged nodes.  SKIN/HAIR/NAILS: Skin color was normal.  There were no concerning skin lesions.  NEUROLOGIC: The patient was alert and oriented to person, place, time, and circumstance. Speech was normal. Cranial nerves were normal. Motor strength was normal for age. The patient was normally coordinated.  Sensation intact.  PSYCHIATRIC:  Mood and affect were normal and the patient had normal recent and remote memory. The patient's judgment and insight were normal.        ASSESSMENT/PLAN:   1. Routine general medical examination at a [facility]" facility  Immunizations are reviewed and providing Tdap.  Recommending annual flu shot and to consider COVID vaccination.  She has a living will.  Non-smoker.  Uses alcohol in moderation.  Regular exercise and good diet habits to maintain a healthy weight discussed.  Up to date with colonoscopies and this should be repeated in 2024.  Recommending annual mammogram for breast cancer screening.  Continue to follow-up with OB/GYN every 3 to 5 years.  She sees an ophthalmologist every year.  Seeing a dentist every 6 months recommended.  Skin exam performed and recommending regular use of sunblock.  We discussed seeing a dermatologist every 1 to 2 years for total-body skin exam.  Hepatitis C antibody for screening was normal.  Will screen for diabetes with fasting glucose.  Checking fasting lipid profile.  - Lipid Profile (Chol, Trig, HDL, LDL calc); Future  - CBC with platelets; Future  - Comprehensive metabolic panel (BMP + Alb, Alk Phos, ALT, AST, Total. Bili, TP); Future  - TSH with free T4 reflex; Future  - UA Macroscopic with reflex to Microscopic and Culture - Lab Collect; Future  - Lipid Profile (Chol, Trig, HDL, LDL calc)  - CBC with platelets  - Comprehensive metabolic panel (BMP + Alb, Alk Phos, ALT, AST, Total. Bili, TP)  - TSH with free T4 reflex  - UA Macroscopic with reflex to Microscopic and Culture - Lab Collect  - UA Microscopic with Reflex to Culture    2. Hypercholesterolemia  Cholesterol has been elevated including LDL over 170 several years ago.  No other risk factors for coronary artery disease.  However, I would recommend getting a CT coronary calcium score completed if her cholesterol remains elevated to help decide whether she may benefit from taking a statin.  Information provided for her to set up at [place] [facility]  - Lipid Profile (Chol, Trig, HDL, LDL calc); Future  - Lipid Profile (Chol, Trig, HDL, LDL calc)    The 10-year ASCVD risk score (Nawaf DK, et al., 2019) is: 4.8%    Values  used to calculate the score:      Age: 63 years      Sex: Female      Is Non- : No      Diabetic: No      Tobacco smoker: No      Systolic Blood Pressure: 122 mmHg      Is BP treated: No      HDL Cholesterol: 53 mg/dL      Total Cholesterol: 248 mg/dL    3. Esophageal dysphagia  She is describing intermittent esophageal dysphagia over the last year.  This was not a previous problem.  With more dense foods, she will feel the food getting lodged in her chest.  It can be quite uncomfortable.  She does have some intermittent reflux.  We discussed potential etiologies of her symptoms including esophageal reflux with esophagitis or stricture, eosinophilic esophagitis, Schatzki's ring, and esophageal cancer.  While she will start omeprazole 20 mg daily for the next 6 weeks, I am recommending that she pursue an EGD.  There is family history for esophageal cancer.  Referral is made.  - Adult GI  Referral - Procedure Only; Future    4. Gastroesophageal reflux disease, unspecified whether esophagitis present  Intermittent reflux and now with dysphagia.  Recommending omeprazole 20 mg daily before breakfast for the next 6 weeks.  We discussed lifestyle modification including weight loss to help control reflux.  She has noticed that symptoms are worse since gaining weight over the past several years.  - omeprazole (PRILOSEC OTC) 20 MG EC tablet; Take 1 tablet (20 mg) by mouth daily for 42 days    5. Suspected sleep apnea  She is a loud snorer and does feel tired in the morning even after 8-10 hours of sleep.  She has siblings with sleep apnea.  Recommending evaluation at our sleep clinic.  We discussed the benefits of weight loss to help improve sleep apnea symptoms.  - Adult Sleep Eval & Management  Referral; Future          Patient has been advised of split billing requirements and indicates understanding: Yes            BMI:   Estimated body mass index is 34.31 kg/m  as calculated from  "the following:    Height as of this encounter: 1.638 m (5' 4.5\").    Weight as of this encounter: 92.1 kg (203 lb).         She reports that she has never smoked. She has never used smokeless tobacco.      Manolo Metzger MD  [facility]  "

## 2023-07-21 LAB
ALBUMIN SERPL BCG-MCNC: 4.2 G/DL (ref 3.5–5.2)
ALP SERPL-CCNC: 67 U/L (ref 35–104)
ALT SERPL W P-5'-P-CCNC: 24 U/L (ref 0–50)
ANION GAP SERPL CALCULATED.3IONS-SCNC: 11 MMOL/L (ref 7–15)
AST SERPL W P-5'-P-CCNC: 32 U/L (ref 0–45)
BILIRUB SERPL-MCNC: 0.4 MG/DL
BUN SERPL-MCNC: 19 MG/DL (ref 8–23)
CALCIUM SERPL-MCNC: 9.4 MG/DL (ref 8.8–10.2)
CHLORIDE SERPL-SCNC: 104 MMOL/L (ref 98–107)
CHOLEST SERPL-MCNC: 212 MG/DL
CREAT SERPL-MCNC: 0.85 MG/DL (ref 0.51–0.95)
DEPRECATED HCO3 PLAS-SCNC: 24 MMOL/L (ref 22–29)
GFR SERPL CREATININE-BSD FRML MDRD: 77 ML/MIN/1.73M2
GLUCOSE SERPL-MCNC: 84 MG/DL (ref 70–99)
HDLC SERPL-MCNC: 47 MG/DL
LDLC SERPL CALC-MCNC: 148 MG/DL
NONHDLC SERPL-MCNC: 165 MG/DL
POTASSIUM SERPL-SCNC: 4.3 MMOL/L (ref 3.4–5.3)
PROT SERPL-MCNC: 6.9 G/DL (ref 6.4–8.3)
SODIUM SERPL-SCNC: 139 MMOL/L (ref 136–145)
TRIGL SERPL-MCNC: 84 MG/DL
TSH SERPL DL<=0.005 MIU/L-ACNC: 3.04 UIU/ML (ref 0.3–4.2)

## 2023-09-20 ASSESSMENT — SLEEP AND FATIGUE QUESTIONNAIRES
HOW LIKELY ARE YOU TO NOD OFF OR FALL ASLEEP WHILE SITTING AND TALKING TO SOMEONE: WOULD NEVER DOZE
HOW LIKELY ARE YOU TO NOD OFF OR FALL ASLEEP WHILE WATCHING TV: SLIGHT CHANCE OF DOZING
HOW LIKELY ARE YOU TO NOD OFF OR FALL ASLEEP IN A CAR, WHILE STOPPED FOR A FEW MINUTES IN TRAFFIC: WOULD NEVER DOZE
HOW LIKELY ARE YOU TO NOD OFF OR FALL ASLEEP WHILE SITTING AND READING: SLIGHT CHANCE OF DOZING
HOW LIKELY ARE YOU TO NOD OFF OR FALL ASLEEP WHILE SITTING INACTIVE IN A PUBLIC PLACE: WOULD NEVER DOZE
HOW LIKELY ARE YOU TO NOD OFF OR FALL ASLEEP WHILE SITTING QUIETLY AFTER LUNCH WITHOUT ALCOHOL: WOULD NEVER DOZE
HOW LIKELY ARE YOU TO NOD OFF OR FALL ASLEEP WHILE LYING DOWN TO REST IN THE AFTERNOON WHEN CIRCUMSTANCES PERMIT: SLIGHT CHANCE OF DOZING
HOW LIKELY ARE YOU TO NOD OFF OR FALL ASLEEP WHEN YOU ARE A PASSENGER IN A CAR FOR AN HOUR WITHOUT A BREAK: HIGH CHANCE OF DOZING

## 2023-09-22 ENCOUNTER — OFFICE VISIT (OUTPATIENT)
Dept: SLEEP MEDICINE | Facility: CLINIC | Age: 63
End: 2023-09-22
Attending: INTERNAL MEDICINE
Payer: COMMERCIAL

## 2023-09-22 VITALS
HEART RATE: 64 BPM | HEIGHT: 55 IN | OXYGEN SATURATION: 98 % | BODY MASS INDEX: 47.44 KG/M2 | RESPIRATION RATE: 20 BRPM | WEIGHT: 205 LBS | DIASTOLIC BLOOD PRESSURE: 78 MMHG | TEMPERATURE: 98.1 F | SYSTOLIC BLOOD PRESSURE: 126 MMHG

## 2023-09-22 DIAGNOSIS — R06.83 SNORING: ICD-10-CM

## 2023-09-22 DIAGNOSIS — E66.811 CLASS 1 OBESITY DUE TO EXCESS CALORIES WITH SERIOUS COMORBIDITY AND BODY MASS INDEX (BMI) OF 34.0 TO 34.9 IN ADULT: ICD-10-CM

## 2023-09-22 DIAGNOSIS — E66.09 CLASS 1 OBESITY DUE TO EXCESS CALORIES WITH SERIOUS COMORBIDITY AND BODY MASS INDEX (BMI) OF 34.0 TO 34.9 IN ADULT: ICD-10-CM

## 2023-09-22 DIAGNOSIS — K21.9 GASTROESOPHAGEAL REFLUX DISEASE, UNSPECIFIED WHETHER ESOPHAGITIS PRESENT: ICD-10-CM

## 2023-09-22 DIAGNOSIS — R29.818 SUSPECTED SLEEP APNEA: Primary | ICD-10-CM

## 2023-09-22 PROCEDURE — 99214 OFFICE O/P EST MOD 30 MIN: CPT | Performed by: INTERNAL MEDICINE

## 2023-09-22 NOTE — PROGRESS NOTES
Outpatient Sleep Medicine Consultation:      Name: Chela Meeks MRN# [id number]   Age: 63 year old YOB: 1960     Date of Consultation: September 22, 2023  Consultation is requested by: Manolo Metzger MD  [address] [place] [address] Manolo Metzger  Primary care provider: Manolo Metzger       Reason for Sleep Consult:     Chela Meeks is sent by Manolo Metzger for a sleep consultation regarding snoring and fatigue.    Patient s Reason for visit  Chela Meeks main reason for visit: Severe Snoring, sleep well and enough but do not always feel  well rested, lack of energy during day  Patient states problem(s) started: Years ago  Chela M Jeanna's goals for this visit: Evaluate discuss options           Assessment and Plan:     Summary Sleep Diagnoses:  Snoring and suspected sleep apnea    Comorbid Diagnoses:  GERD  Obesity      Summary Recommendations:  Home sleep test for suspected sleep apnea with snoring, heartburn, and daytime fatigue  Orders Placed This Encounter   Procedures    HST-Home Sleep Apnea Test - Noxturnal Returnable           Summary Counseling:    Sleep Testing Reviewed  Obstructive Sleep Apnea Reviewed  Complications of Untreated Sleep Apnea Reviewed      Medical Decision-making:   Educational materials provided in instructions    Total time spent reviewing medical records, history and physical examination, review of previous testing and interpretation as well as documentation on this date:30    CC: Manolo Metzger          History of Present Illness:     Patient with chronic loud snoring and morning fatigue.  2 brothers with sleep apnea.  No leg complaints at night.  She does have heartburn and a rare morning headache.  Rare nocturia.    SLEEP-WAKE SCHEDULE:     Work/School Days: Patient goes to school/work: No   Usually gets into bed at 10-11 pm  Takes patient about Minutes to fall asleep  Has trouble falling asleep <1 nights per  week  Wakes up in the middle of the night Maybe once times.  Wakes up due to Use the bathroom  She has trouble falling back asleep Maybe once a week times a week.   It usually takes Usually nothing  at all, occasionally hours to get back to sleep  Patient is usually up at 7-8 am  Uses alarm: No    Weekends/Non-work Days/All Other Days:  Usually gets into bed at 10-11 pm   Takes patient about Minutes to fall asleep  Patient is usually up at 7-8 am  Uses alarm: No    Sleep Need  Patient gets  7-8 hrs sleep on average   Patient thinks she needs about 8 hrs sleep    Chela Meeks prefers to sleep in this position(s): Side   Patient states they do the following activities in bed: Read;Use phone, computer, or tablet    Naps  Patient takes a purposeful nap 0 times a week and naps are usually Na in duration  She feels better after a nap:    She dozes off unintentionally 1-2 days per week  Patient has had a driving accident or near-miss due to sleepiness/drowsiness: No      SLEEP DISRUPTIONS:    Breathing/Snoring  Patient snores:Yes  Other people complain about her snoring: Yes  Patient has been told she stops breathing in her sleep:   She has issues with the following: Morning headaches;Morning mouth dryness;Stuffy nose when you wake up    Movement:  Patient gets pain, discomfort, with an urge to move:  Yes  It happens when she is resting:  Yes  It happens more at night:  Yes  Patient has been told she kicks her legs at night:  No     Behaviors in Sleep:  Chela Meeks has experienced the following behaviors while sleeping:    She has experienced sudden muscle weakness during the day: No      Is there anything else you would like your sleep provider to know:          CAFFEINE AND OTHER SUBSTANCES:    Patient consumes caffeinated beverages per day:  0,  1cup decaf coffee  Last caffeine use is usually: Na  List of any prescribed or over the counter stimulants that patient takes:    List of any prescribed or over  the counter sleep medication patient takes: Ibuprofen pm occasionally  List of previous sleep medications that patient has tried:    Patient drinks alcohol to help them sleep: No  Patient drinks alcohol near bedtime: No    Family History:  Patient has a family member been diagnosed with a sleep disorder: Yes  3 brothers. Both parents (not diagnosed) but severe snorers         SCALES:    EPWORTH SLEEPINESS SCALE         9/20/2023     6:11 PM    [place] Sleepiness Scale ( ANA MARIA Downey  [id number]<br>ESS - [place]/English - Final version - 21 Nov 07 - [facility] [place].)   Sitting and reading Slight chance of dozing   Watching TV Slight chance of dozing   Sitting, inactive in a public place (e.g. a theatre or a meeting) Would never doze   As a passenger in a car for an hour without a break High chance of dozing   Lying down to rest in the afternoon when circumstances permit Slight chance of dozing   Sitting and talking to someone Would never doze   Sitting quietly after a lunch without alcohol Would never doze   In a car, while stopped for a few minutes in traffic Would never doze   [place] Score (MC) 6   [place] Score (Sleep) 6         INSOMNIA SEVERITY INDEX (MARLENA)          9/20/2023     5:54 PM   Insomnia Severity Index (MARLENA)   Difficulty falling asleep 0   Difficulty staying asleep 0   Problems waking up too early 0   How SATISFIED/DISSATISFIED are you with your CURRENT sleep pattern? 1   How NOTICEABLE to others do you think your sleep problem is in terms of impairing the quality of your life? 1   How WORRIED/DISTRESSED are you about your current sleep problem? 1   To what extent do you consider your sleep problem to INTERFERE with your daily functioning (e.g. daytime fatigue, mood, ability to function at work/daily chores, concentration, memory, mood, etc.) CURRENTLY? 1   MARLENA Total Score 4       Guidelines for Scoring/Interpretation:  Total score categories:  0-7 = No clinically significant insomnia   8-14 =  "Subthreshold insomnia   15-21 = Clinical insomnia (moderate severity)  22-28 = Clinical insomnia (severe)  Used via courtesy of www.myhealth.va.gov with permission from Manolo Delong PhD., [facility]      STOP BANG         9/22/2023     8:05 AM   STOP BANG Questionnaire (  2008, the American Society of Anesthesiologists, Inc. [employer]   B/P Clinic: 126/78   BMI Clinic: 223.51         GAD7         No data to display                    CAGE-AID        9/20/2023     6:09 PM   CAGE-AID Flowsheet   Have you ever felt you should Cut down on your drinking or drug use? 0   Have people Annoyed you by criticizing your drinking or drug use? 0   Have you ever felt bad or Guilty about your drinking or drug use? 0   Have you ever had a drink or used drugs first thing in the morning to steady your nerves or to get rid of a hangover? (Eye opener) 0   CAGE-AID SCORE 0   Have you ever felt you should Cut down on your drinking or drug use? No   Have people Annoyed you by criticizing your drinking or drug use? No   Have you ever felt bad or Guilty about your drinking or drug use? No   Have you ever had a drink or used drugs first thing in the morning to steady your nerves or to get rid of a hangover? (Eye opener) No   CAGE-AID SCORE 0 (A total score of 2 or greater is considered clinically significant)       CAGE-AID reprinted with permission from the [place] Medical Journal, ROSEANNE Hernandez. and JL Teixeira, \"Conjoint screening questionnaires for alcohol and drug abuse\" [place] Medical Journal 94: 135-140, 1995.      PATIENT HEALTH QUESTIONNAIRE-9 (PHQ - 9)        8/24/2020    11:20 AM   PHQ-9 ([employer])   1.  Little interest or pleasure in doing things 0   2.  Feeling down, depressed, or hopeless 0       Developed by Nancy Contreras, Adeline Shipley, Jose A Walton and colleagues, with an educational anali from [employer] No permission required to reproduce, translate, display or " distribute.        Allergies:    No Known Allergies    Medications:    Current Outpatient Medications   Medication Sig Dispense Refill    fexofenadine (ALLEGRA) 180 MG tablet [FEXOFENADINE (ALLEGRA) 180 MG TABLET] Take 1 tablet (180 mg total) by mouth daily as needed. 30 tablet 2       Problem List:  Patient Active Problem List    Diagnosis Date Noted    Class 1 obesity due to excess calories with serious comorbidity and body mass index (BMI) of 34.0 to 34.9 in adult 2023     Priority: Medium    Esophageal dysphagia 2023     Priority: Medium    GERD (gastroesophageal reflux disease) 2023     Priority: Medium    Suspected sleep apnea 2023     Priority: Medium    Allergic rhinitis      Priority: Medium    Hypercholesterolemia      Priority: Medium      total cholesterol 205 ,          Impingement syndrome of right shoulder 10/24/2017     Priority: Medium        Past Medical/Surgical History:  Past Medical History:   Diagnosis Date    Allergic rhinitis     Esophageal dysphagia 2023    GERD (gastroesophageal reflux disease) 2023    Hypercholesterolemia      total cholesterol 205 ,      Impingement syndrome of right shoulder     Screening for osteoporosis     DEXA 2017 normal    Suspected sleep apnea 2023     Past Surgical History:   Procedure Laterality Date    CATARACT EXTRACTION Bilateral 2017     SECTION      COLONOSCOPY      2014 normal, repeat in 10 years    TUBAL LIGATION         Social History:  Social History     Socioeconomic History    Marital status:      Spouse name: Not on file    Number of children: Not on file    Years of education: Not on file    Highest education level: Not on file   Occupational History    Not on file   Tobacco Use    Smoking status: Never    Smokeless tobacco: Never   Vaping Use    Vaping Use: Never used   Substance and Sexual Activity    Alcohol use: Yes      "Comment: Alcoholic Drinks/day: 2-4 / week    Drug use: Not on file    Sexual activity: Not on file   Other Topics Concern    Not on file   Social History Narrative      [employer].   Retired 3 children  7 grandchildren     Social Determinants of Health     Financial Resource Strain: Not on file   Food Insecurity: Not on file   Transportation Needs: Not on file   Physical Activity: Not on file   Stress: Not on file   Social Connections: Not on file   Interpersonal Safety: Not on file   Housing Stability: Not on file       Family History:  Family History   Problem Relation Age of Onset    Lung Cancer Mother     Esophageal Cancer Father     Prostate Cancer Father     Diabetes Type 2  Brother     Sleep Apnea Brother     No Known Problems Brother        Review of Systems:  A complete review of systems reviewed by me is negative with the exeption of what has been mentioned in the history of present illness.  In the last TWO WEEKS have you experienced any of the following symptoms?  Fevers: No  Night Sweats: No  Weight Gain: No  Pain at Night: No  Double Vision: No  Changes in Vision: No  Difficulty Breathing through Nose: No  Sore Throat in Morning: No  Dry Mouth in the Morning: Yes  Shortness of Breath Lying Flat: No  Shortness of Breath With Activity: No  Awakening with Shortness of Breath: No  Increased Cough: No  Heart Racing at Night: No  Swelling in Feet or Legs: No  Diarrhea at Night: No  Heartburn at Night: No  Urinating More than Once at Night: No  Losing Control of Urine at Night: No  Joint Pains at Night: No  Headaches in Morning: Yes  Weakness in Arms or Legs: No  Depressed Mood: No  Anxiety: No     Physical Examination:  Vitals: /78   Pulse 64   Temp 98.1  F (36.7  C) (Tympanic)   Resp 20   Ht 0.645 m (2' 1.39\")   Wt 93 kg (205 lb)   SpO2 98%   .51 kg/m    BMI= Body mass index is 223.51 kg/m .           GENERAL APPEARANCE: Overweight but otherwise " healthy-appearing  EYES: Unremarkable  HENT: Unremarkable  NECK: Thick, no adenopathy or thyromegaly  RESP: Lungs clear.  No increased work of breathing  CV: Regular, no murmur, no edema.  Normal carotid and posterior tibial pulsations  ABDOMEN: Nontender  NEURO: Alert, oriented, speech fluent, memory good, cranial nerves normal, gait normal  PSYCH: Normal mood and affect  Mallampati Class: II.  Tonsillar Stage: 2  visible at pillars.         Data: All pertinent previous laboratory data reviewed     Recent Labs   Lab Test 07/20/23 1747 08/24/20  1202    140   POTASSIUM 4.3 4.7   CHLORIDE 104 103   CO2 24 26   ANIONGAP 11 11   GLC 84 93   BUN 19.0 10   CR 0.85 0.78   KINGSLEY 9.4 9.6       Recent Labs   Lab Test 07/20/23  1747   WBC 9.1   RBC 4.47   HGB 13.1   HCT 39.8   MCV 89   MCH 29.3   MCHC 32.9   RDW 12.9          Recent Labs   Lab Test 07/20/23  1747   PROTTOTAL 6.9   ALBUMIN 4.2   BILITOTAL 0.4   ALKPHOS 67   AST 32   ALT 24       TSH (uIU/mL)   Date Value   07/20/2023 3.04         Jabari Morrison MD 9/22/2023

## 2023-09-22 NOTE — PROGRESS NOTES
"  {PROVIDER CHARTING PREFERENCE:846573}    Subjective   Chela is a 63 year old, presenting for the following health issues:  Sleep Problem (New pt here for sleep consult)    HPI     ***      Review of Systems   {ROS COMP (Optional):163304}      Objective    /78   Pulse 64   Temp 98.1  F (36.7  C) (Tympanic)   Resp 20   Ht 0.645 m (2' 1.39\")   Wt 93 kg (205 lb)   SpO2 98%   .51 kg/m    Body mass index is 223.51 kg/m .  Physical Exam   {Exam List (Optional):361716}    {Diagnostic Test Results (Optional):260594}    {AMBULATORY ATTESTATION (Optional):724773}              "

## 2023-09-25 ENCOUNTER — TELEPHONE (OUTPATIENT)
Dept: INTERNAL MEDICINE | Facility: CLINIC | Age: 63
End: 2023-09-25
Payer: COMMERCIAL

## 2023-09-25 NOTE — TELEPHONE ENCOUNTER
General Call    Contacts         Type Contact Phone/Fax    09/25/2023 11:09 AM CDT Phone (Outgoing) Chela Meeks (Self) [phone] (M)          Reason for Call:  PT appeared in workqueue, sleep central scheduling does not schedule for [facility]. Please contact pt to schedule.     What are your questions or concerns:  na    Date of last appointment with provider: na    Could we send this information to you in [employer] [facility] or would you prefer to receive a phone call?:   Patient would prefer a phone call   Okay to leave a detailed message?: Yes at Cell number on file:    Telephone Information:   Mobile [phone]

## 2023-10-03 ENCOUNTER — TRANSFERRED RECORDS (OUTPATIENT)
Dept: HEALTH INFORMATION MANAGEMENT | Facility: CLINIC | Age: 63
End: 2023-10-03
Payer: COMMERCIAL

## 2023-10-05 ENCOUNTER — TRANSFERRED RECORDS (OUTPATIENT)
Dept: HEALTH INFORMATION MANAGEMENT | Facility: CLINIC | Age: 63
End: 2023-10-05
Payer: COMMERCIAL

## 2023-10-11 ENCOUNTER — OFFICE VISIT (OUTPATIENT)
Dept: SLEEP MEDICINE | Facility: CLINIC | Age: 63
End: 2023-10-11
Payer: COMMERCIAL

## 2023-10-11 ENCOUNTER — TRANSFERRED RECORDS (OUTPATIENT)
Dept: HEALTH INFORMATION MANAGEMENT | Facility: CLINIC | Age: 63
End: 2023-10-11

## 2023-10-11 DIAGNOSIS — R06.83 SNORING: ICD-10-CM

## 2023-10-11 DIAGNOSIS — R29.818 SUSPECTED SLEEP APNEA: ICD-10-CM

## 2023-10-11 PROCEDURE — G0399 HOME SLEEP TEST/TYPE 3 PORTA: HCPCS | Performed by: INTERNAL MEDICINE

## 2023-10-12 ENCOUNTER — DOCUMENTATION ONLY (OUTPATIENT)
Dept: SLEEP MEDICINE | Facility: CLINIC | Age: 63
End: 2023-10-12
Payer: COMMERCIAL

## 2023-10-12 NOTE — PROGRESS NOTES
Pt returned HST device. It was downloaded and forwarded data to the clinical specialist for scoring.      HST POST-STUDY QUESTIONNAIRE    What time did you go to bed?  11pm  How long do you think it took to fall asleep?  30 min  What time did you wake up to start the day?  8 am  Did you get up during the night at all?  yes  If you woke up, do you remember approximately what time(s)? no  Did you have any difficulty with the equipment?  No  Did you us any type of treatment with this study?  None  Was the head of the bed elevated? No  Did you sleep in a recliner?  No  Did you stop using CPAP at least 3 days before this test?  NA  Any other information you'd like us to know? Didn't sleep as sound as I normally do... worried about the test and equipment

## 2023-10-24 ENCOUNTER — TRANSFERRED RECORDS (OUTPATIENT)
Dept: HEALTH INFORMATION MANAGEMENT | Facility: CLINIC | Age: 63
End: 2023-10-24
Payer: COMMERCIAL

## 2023-11-03 ENCOUNTER — OFFICE VISIT (OUTPATIENT)
Dept: SLEEP MEDICINE | Facility: CLINIC | Age: 63
End: 2023-11-03
Payer: COMMERCIAL

## 2023-11-03 VITALS
DIASTOLIC BLOOD PRESSURE: 80 MMHG | OXYGEN SATURATION: 97 % | WEIGHT: 201 LBS | BODY MASS INDEX: 33.49 KG/M2 | SYSTOLIC BLOOD PRESSURE: 138 MMHG | HEIGHT: 65 IN | TEMPERATURE: 98.6 F | HEART RATE: 60 BPM | RESPIRATION RATE: 16 BRPM

## 2023-11-03 DIAGNOSIS — G47.33 OBSTRUCTIVE SLEEP APNEA SYNDROME, MODERATE: Primary | ICD-10-CM

## 2023-11-03 DIAGNOSIS — E66.09 CLASS 1 OBESITY DUE TO EXCESS CALORIES WITH SERIOUS COMORBIDITY AND BODY MASS INDEX (BMI) OF 34.0 TO 34.9 IN ADULT: ICD-10-CM

## 2023-11-03 DIAGNOSIS — E66.811 CLASS 1 OBESITY DUE TO EXCESS CALORIES WITH SERIOUS COMORBIDITY AND BODY MASS INDEX (BMI) OF 34.0 TO 34.9 IN ADULT: ICD-10-CM

## 2023-11-03 PROCEDURE — 99213 OFFICE O/P EST LOW 20 MIN: CPT | Performed by: INTERNAL MEDICINE

## 2023-11-03 ASSESSMENT — SLEEP AND FATIGUE QUESTIONNAIRES
HOW LIKELY ARE YOU TO NOD OFF OR FALL ASLEEP WHEN YOU ARE A PASSENGER IN A CAR FOR AN HOUR WITHOUT A BREAK: MODERATE CHANCE OF DOZING
HOW LIKELY ARE YOU TO NOD OFF OR FALL ASLEEP WHILE SITTING AND READING: SLIGHT CHANCE OF DOZING
HOW LIKELY ARE YOU TO NOD OFF OR FALL ASLEEP WHILE LYING DOWN TO REST IN THE AFTERNOON WHEN CIRCUMSTANCES PERMIT: SLIGHT CHANCE OF DOZING
HOW LIKELY ARE YOU TO NOD OFF OR FALL ASLEEP WHILE SITTING AND TALKING TO SOMEONE: WOULD NEVER DOZE
HOW LIKELY ARE YOU TO NOD OFF OR FALL ASLEEP WHILE SITTING INACTIVE IN A PUBLIC PLACE: WOULD NEVER DOZE
HOW LIKELY ARE YOU TO NOD OFF OR FALL ASLEEP WHILE SITTING QUIETLY AFTER LUNCH WITHOUT ALCOHOL: WOULD NEVER DOZE
HOW LIKELY ARE YOU TO NOD OFF OR FALL ASLEEP WHILE WATCHING TV: WOULD NEVER DOZE
HOW LIKELY ARE YOU TO NOD OFF OR FALL ASLEEP IN A CAR, WHILE STOPPED FOR A FEW MINUTES IN TRAFFIC: WOULD NEVER DOZE

## 2023-11-03 ASSESSMENT — ENCOUNTER SYMPTOMS
PALPITATIONS: 0
HEADACHES: 0
SHORTNESS OF BREATH: 0
HEARTBURN: 1
SLEEP DISTURBANCE: 0
UNEXPECTED WEIGHT CHANGE: 0
FATIGUE: 0

## 2023-11-03 NOTE — PROGRESS NOTES
"  Assessment & Plan   Problem List Items Addressed This Visit       Obstructive sleep apnea syndrome, moderate - Primary     10/11/2023 HST revealing AHI 18.5 and oximetry radha 76% consistent with moderate obstructive sleep apnea  I reviewed all of the treatment options with the patient  Reviewed the importance of weight management           Relevant Orders    CPAP Order for DME - ONLY FOR DME (Completed)    Class 1 obesity due to excess calories with serious comorbidity and body mass index (BMI) of 34.0 to 34.9 in adult    Relevant Orders    Adult Comprehensive Weight Management  Referral                 CPAP follow-up in 2 to 3 months    No follow-ups on file.    Jabari Morrison MD  [facility]    Bernardo York is a 63 year old, presenting for the following health issues:  Study Results (Pt here for Follow-up on HST results)    HPI     Patient with chronic snoring here for follow-up of a home sleep test.  Does not characterize herself as having daytime sleepiness though she is \"not a morning person.\"  Does have heartburn and occasional morning headaches.      Review of Systems   Constitutional:  Negative for fatigue and unexpected weight change.   Respiratory:  Negative for shortness of breath.    Cardiovascular:  Negative for chest pain, palpitations and peripheral edema.   Gastrointestinal:  Positive for heartburn.   Genitourinary:         Nocturia once   Neurological:  Negative for headaches.   Psychiatric/Behavioral:  Negative for sleep disturbance.             Objective    /80   Pulse 60   Temp 98.6  F (37  C) (Tympanic)   Resp 16   Ht 1.638 m (5' 4.5\")   Wt 91.2 kg (201 lb)   SpO2 97%   BMI 33.97 kg/m    Body mass index is 33.97 kg/m .  Physical Exam   Overweight woman in no distress  Eyes normal  HEENT exam reveals a crowded oropharynx.  Mallampati 2  Neck thick  Lungs clear  Cardiac exam regular no edema  Alert and oriented.  Cranial nerves " normal    With

## 2023-11-03 NOTE — PROGRESS NOTES
"HOME SLEEP STUDY INTERPRETATION        Patient: Chela Meeks  MRN: [id number]  YOB: 1960  Study Date: 10/11/2023  PCP/Referring Provider: Manolo Metzger;   Ordering Provider: Jabari Morrison MD         Indications for Home Study: Chela Meeks is a 63 year old female with a history of obesity and acid reflux who presents with symptoms suggestive of obstructive sleep apnea.    Estimated body mass index is 223.51 kg/m  as calculated from the following:    Height as of 9/22/23: 0.645 m (2' 1.39\").    Weight as of 9/22/23: 93 kg (205 lb).  Total score - [place]: 6 (9/20/2023  6:11 PM)  Total Score: 3 (9/20/2023  6:12 PM)        Data: A full night home sleep study was performed recording the standard physiologic parameters including body position, movement, sound, nasal pressure, thermal oral airflow, chest and abdominal movements with respiratory inductance plethysmography, and oxygen saturation by pulse oximetry. Pulse rate was estimated by oximetry recording. This study was considered adequate based on > 4 hours of quality oximetry and respiratory recording. As specified by the AASM Manual for the Scoring of Sleep and Associated events, version 2.3, Rule VIII.D 1B, 4% oxygen desaturation scoring for hypopneas is used as a standard of care on all home sleep apnea testing.        Analysis Time:  446 minutes        Respiration:   Sleep Associated Hypoxemia: sustained hypoxemia was not present. Baseline oxygen saturation was 92%.  Time with saturation less than or equal to 88% was 23 minutes. The lowest oxygen saturation was 76%.   Snoring: Snoring was present.  Respiratory events: The home study revealed a presence of 45 obstructive apneas and 1 mixed and central apneas. There were 92 hypopneas resulting in a combined apnea/hypopnea index [AHI] of 18.5 events per hour.  AHI was 32 per hour supine, 11 per hour nonsupine.   Pattern: Excluding events noted above, respiratory rate and pattern " was Normal.      Position: Percent of time spent: supine - 34%, prone - 15%, on left - 36%, on right - 14%.      Heart Rate: By pulse oximetry normal rate was noted.       Assessment:   Moderate obstructive sleep apnea.  Sleep associated hypoxemia was not present.    Recommendations:  Consider auto-CPAP at 5-20 cmH2O or polysomnography with full night PAP titration.  Suggest optimizing sleep hygiene and avoiding sleep deprivation.  Weight management.        Diagnosis Code(s): Obstructive Sleep Apnea G47.33    Electronically signed by: Jabari Morrison MD, November 3, 2023   Diplomate, American Board of Internal Medicine, Sleep Medicine

## 2023-11-15 ENCOUNTER — DOCUMENTATION ONLY (OUTPATIENT)
Dept: SLEEP MEDICINE | Facility: CLINIC | Age: 63
End: 2023-11-15
Payer: COMMERCIAL

## 2023-11-15 DIAGNOSIS — G47.33 OBSTRUCTIVE SLEEP APNEA (ADULT) (PEDIATRIC): Primary | ICD-10-CM

## 2023-11-15 NOTE — PROGRESS NOTES
Patient was offered choice of vendor and chose [facility].  Patient Chela Meeks was set up at [place]  on November 15, 2023. Patient received a Resmed Airsense 10 Pressures were set at  5-20 cm H2O.   Patient s ramp is 5 cm H2O for Off and FLEX/EPR is EPR, 2.  Patient received a Resmed Mask name: Airfit [id number] for her  Nasal mask size Small, heated tubing and heated humidifier.  Patient has the following compliance requirements: none  Patient has a follow up on TBD with Jabari Morrison.    Aaron Matthews

## 2023-11-20 ENCOUNTER — DOCUMENTATION ONLY (OUTPATIENT)
Dept: SLEEP MEDICINE | Facility: CLINIC | Age: 63
End: 2023-11-20
Payer: COMMERCIAL

## 2023-11-20 NOTE — PROGRESS NOTES
3 day Sleep therapy management telephone visit    Diagnostic AHI:  26.4 HST    Confirmed with patient at time of call- N/A Patient is still interested in STM service       Data only recheck        Objective data     Order Settings for PAP  CPAP min     CPAP max              Device settings from machine CPAP min 5.0     CPAP max 20.0           EPR Setting TWO    RESMED soft response  OFF     Assessment: Nightly usage over four hours      Action plan: Patient to have 14 day STM visit. Patient has a follow up visit scheduled:   no    Replacement device: No  STM ordered by provider: Yes     Total time spent on accessing and  interpreting remote patient PAP therapy data  10 minutes    Total time spent counseling, coaching  and reviewing PAP therapy data with patient  0 minutes    00003 no

## 2023-11-27 ENCOUNTER — TRANSFERRED RECORDS (OUTPATIENT)
Dept: HEALTH INFORMATION MANAGEMENT | Facility: CLINIC | Age: 63
End: 2023-11-27
Payer: COMMERCIAL

## 2024-01-05 NOTE — PROGRESS NOTES
This HSAT was performed using a Noxturnal T3 device which recorded snore, sound, movement activity, body position, nasal pressure, oronasal thermal airflow, pulse, oximetry and both chest and abdominal respiratory effort. HSAT data was restricted to the time patient states they were in bed.     HSAT was scored using 1B 4% hypopnea rule.     HST AHI (Non-PAT): 18.5  Snoring was reported as mild and moderate.  Time with SpO2 below 89% was 16.8 minutes.   Overall signal quality was good     Pt will follow up with sleep provider to determine appropriate therapy.

## 2024-02-13 ENCOUNTER — HOSPITAL ENCOUNTER (OUTPATIENT)
Dept: MAMMOGRAPHY | Facility: CLINIC | Age: 64
Discharge: HOME | End: 2024-02-13
Attending: INTERNAL MEDICINE | Admitting: INTERNAL MEDICINE
Payer: COMMERCIAL

## 2024-02-13 DIAGNOSIS — Z12.31 VISIT FOR SCREENING MAMMOGRAM: ICD-10-CM

## 2024-02-13 PROCEDURE — 77063 BREAST TOMOSYNTHESIS BI: CPT

## 2024-04-22 ENCOUNTER — TRANSFERRED RECORDS (OUTPATIENT)
Dept: HEALTH INFORMATION MANAGEMENT | Facility: CLINIC | Age: 64
End: 2024-04-22
Payer: COMMERCIAL

## 2024-05-01 ENCOUNTER — PATIENT OUTREACH (OUTPATIENT)
Dept: GASTROENTEROLOGY | Facility: CLINIC | Age: 64
End: 2024-05-01
Payer: COMMERCIAL

## 2024-05-09 ENCOUNTER — TRANSFERRED RECORDS (OUTPATIENT)
Dept: HEALTH INFORMATION MANAGEMENT | Facility: CLINIC | Age: 64
End: 2024-05-09
Payer: COMMERCIAL

## 2024-05-16 ENCOUNTER — TRANSFERRED RECORDS (OUTPATIENT)
Dept: HEALTH INFORMATION MANAGEMENT | Facility: CLINIC | Age: 64
End: 2024-05-16
Payer: COMMERCIAL

## 2024-10-05 ENCOUNTER — HEALTH MAINTENANCE LETTER (OUTPATIENT)
Age: 64
End: 2024-10-05

## 2024-10-10 ENCOUNTER — TRANSFERRED RECORDS (OUTPATIENT)
Dept: HEALTH INFORMATION MANAGEMENT | Facility: CLINIC | Age: 64
End: 2024-10-10
Payer: COMMERCIAL

## 2024-11-11 ENCOUNTER — DOCUMENTATION ONLY (OUTPATIENT)
Dept: FAMILY MEDICINE | Facility: CLINIC | Age: 64
End: 2024-11-11
Payer: COMMERCIAL

## 2024-11-11 DIAGNOSIS — G47.33 OBSTRUCTIVE SLEEP APNEA (ADULT) (PEDIATRIC): Primary | ICD-10-CM

## 2024-12-13 ENCOUNTER — TRANSFERRED RECORDS (OUTPATIENT)
Dept: HEALTH INFORMATION MANAGEMENT | Facility: CLINIC | Age: 64
End: 2024-12-13
Payer: COMMERCIAL

## 2025-02-14 ENCOUNTER — HOSPITAL ENCOUNTER (OUTPATIENT)
Dept: MAMMOGRAPHY | Facility: CLINIC | Age: 65
Discharge: HOME | End: 2025-02-14
Attending: INTERNAL MEDICINE | Admitting: INTERNAL MEDICINE
Payer: COMMERCIAL

## 2025-02-14 DIAGNOSIS — Z12.31 VISIT FOR SCREENING MAMMOGRAM: ICD-10-CM

## 2025-02-14 PROCEDURE — 77063 BREAST TOMOSYNTHESIS BI: CPT

## 2025-04-02 NOTE — ASSESSMENT & PLAN NOTE
10/11/2023 HST revealing AHI 18.5 and oximetry radha 76% consistent with moderate obstructive sleep apnea  I reviewed all of the treatment options with the patient  Reviewed the importance of weight management     n/a

## 2025-05-18 ENCOUNTER — HEALTH MAINTENANCE LETTER (OUTPATIENT)
Age: 65
End: 2025-05-18

## 2025-06-09 ENCOUNTER — TRANSFERRED RECORDS (OUTPATIENT)
Dept: HEALTH INFORMATION MANAGEMENT | Facility: CLINIC | Age: 65
End: 2025-06-09
Payer: COMMERCIAL